# Patient Record
Sex: FEMALE | Race: WHITE | Employment: FULL TIME | URBAN - NONMETROPOLITAN AREA
[De-identification: names, ages, dates, MRNs, and addresses within clinical notes are randomized per-mention and may not be internally consistent; named-entity substitution may affect disease eponyms.]

---

## 2017-08-08 ENCOUNTER — TELEPHONE (OUTPATIENT)
Dept: FAMILY MEDICINE CLINIC | Facility: CLINIC | Age: 57
End: 2017-08-08

## 2019-01-04 ENCOUNTER — TELEPHONE (OUTPATIENT)
Dept: FAMILY MEDICINE CLINIC | Facility: CLINIC | Age: 59
End: 2019-01-04

## 2019-01-04 NOTE — TELEPHONE ENCOUNTER
Calling pt. We have no seen her since Feb. 2014. Phone number does not work. Sending letter, please remove from registry.

## (undated) NOTE — LETTER
01/04/19          [address] [id number]        Dear Saritha Rosen,    We have made several attempts to contact you. We need to know if you are still a patient of Dr. Keena Headley. We have not seen you since 02/2014.   If you are still seeing her a